# Patient Record
Sex: FEMALE | Race: WHITE
[De-identification: names, ages, dates, MRNs, and addresses within clinical notes are randomized per-mention and may not be internally consistent; named-entity substitution may affect disease eponyms.]

---

## 2017-11-17 ENCOUNTER — HOSPITAL ENCOUNTER (OUTPATIENT)
Dept: HOSPITAL 84 - D.MAMMO | Age: 42
Discharge: HOME | End: 2017-11-17
Attending: OBSTETRICS & GYNECOLOGY
Payer: COMMERCIAL

## 2017-11-17 VITALS — BODY MASS INDEX: 33 KG/M2

## 2017-11-17 DIAGNOSIS — Z12.31: Primary | ICD-10-CM

## 2019-01-10 ENCOUNTER — HOSPITAL ENCOUNTER (OUTPATIENT)
Dept: HOSPITAL 84 - D.MAMMO | Age: 44
Discharge: HOME | End: 2019-01-10
Attending: OBSTETRICS & GYNECOLOGY
Payer: COMMERCIAL

## 2019-01-10 VITALS — BODY MASS INDEX: 33 KG/M2

## 2019-01-10 DIAGNOSIS — Z12.31: Primary | ICD-10-CM

## 2019-10-09 ENCOUNTER — HOSPITAL ENCOUNTER (OUTPATIENT)
Dept: HOSPITAL 84 - D.US | Age: 44
Discharge: HOME | End: 2019-10-09
Attending: INTERNAL MEDICINE
Payer: COMMERCIAL

## 2019-10-09 VITALS — BODY MASS INDEX: 33 KG/M2

## 2019-10-09 DIAGNOSIS — R10.9: Primary | ICD-10-CM

## 2019-10-09 DIAGNOSIS — R11.0: ICD-10-CM

## 2019-11-06 ENCOUNTER — HOSPITAL ENCOUNTER (OUTPATIENT)
Dept: HOSPITAL 84 - D.OPS | Age: 44
Discharge: HOME | End: 2019-11-06
Attending: SURGERY
Payer: COMMERCIAL

## 2019-11-06 VITALS
WEIGHT: 175 LBS | BODY MASS INDEX: 28.12 KG/M2 | HEIGHT: 66 IN | HEIGHT: 66 IN | BODY MASS INDEX: 28.12 KG/M2 | WEIGHT: 175 LBS

## 2019-11-06 VITALS — DIASTOLIC BLOOD PRESSURE: 63 MMHG | SYSTOLIC BLOOD PRESSURE: 112 MMHG

## 2019-11-06 DIAGNOSIS — K80.80: Primary | ICD-10-CM

## 2019-11-06 LAB
ANION GAP SERPL CALC-SCNC: 13.1 MMOL/L (ref 8–16)
BASOPHILS NFR BLD AUTO: 0.2 % (ref 0–2)
BUN SERPL-MCNC: 5 MG/DL (ref 7–18)
CALCIUM SERPL-MCNC: 9 MG/DL (ref 8.5–10.1)
CHLORIDE SERPL-SCNC: 103 MMOL/L (ref 98–107)
CO2 SERPL-SCNC: 26.6 MMOL/L (ref 21–32)
CREAT SERPL-MCNC: 0.5 MG/DL (ref 0.6–1.3)
EOSINOPHIL NFR BLD: 3.7 % (ref 0–7)
ERYTHROCYTE [DISTWIDTH] IN BLOOD BY AUTOMATED COUNT: 12.7 % (ref 11.5–14.5)
GLUCOSE SERPL-MCNC: 97 MG/DL (ref 74–106)
HCG UR QL: NEGATIVE
HCT VFR BLD CALC: 42.5 % (ref 36–48)
HGB BLD-MCNC: 14.1 G/DL (ref 12–16)
IMM GRANULOCYTES NFR BLD: 0.4 % (ref 0–5)
LYMPHOCYTES NFR BLD AUTO: 16.9 % (ref 15–50)
MCH RBC QN AUTO: 32 PG (ref 26–34)
MCHC RBC AUTO-ENTMCNC: 33.2 G/DL (ref 31–37)
MCV RBC: 96.4 FL (ref 80–100)
MONOCYTES NFR BLD: 6.4 % (ref 2–11)
NEUTROPHILS NFR BLD AUTO: 72.4 % (ref 40–80)
OSMOLALITY SERPL CALC.SUM OF ELEC: 274 MOSM/KG (ref 275–300)
PLATELET # BLD: 324 10X3/UL (ref 130–400)
PMV BLD AUTO: 9.4 FL (ref 7.4–10.4)
POTASSIUM SERPL-SCNC: 3.7 MMOL/L (ref 3.5–5.1)
RBC # BLD AUTO: 4.41 10X6/UL (ref 4–5.4)
SODIUM SERPL-SCNC: 139 MMOL/L (ref 136–145)
WBC # BLD AUTO: 10.9 10X3/UL (ref 4.8–10.8)

## 2019-11-06 NOTE — NUR
1040-REC'D FROM RR. AWAKE AND ALERT. REPORTS PAIN 5/10 WITH HISTORY
OF CHRONIC PAIN AND WEARS A PAIN PATCH. STERI STRIPS TO ABD X 4 AREAS
CDI. VSS. FAMILY AT BEDSIDE.

## 2019-11-06 NOTE — NUR
1200-DISCHARGE CRITERIA MET. REMOVED IV WITH CATH INTACT,DISPOSED
INTO [facility]ARPS. COVERED SITE WITH BANDAID. REVIEWED POST OPERATIVE
INSTRUCTIONS WITH PT. VERBALIZED UNDERSTANDING. ESCORTED OUT VIA W/C
WITH MOTHER AWAITING TO DRIVE HOME.

## 2019-12-31 ENCOUNTER — HOSPITAL ENCOUNTER (OUTPATIENT)
Dept: HOSPITAL 84 - D.LAB | Age: 44
Discharge: HOME | End: 2019-12-31
Attending: INTERNAL MEDICINE
Payer: COMMERCIAL

## 2019-12-31 VITALS — BODY MASS INDEX: 28.3 KG/M2

## 2019-12-31 DIAGNOSIS — K76.0: Primary | ICD-10-CM

## 2019-12-31 DIAGNOSIS — R74.8: ICD-10-CM

## 2019-12-31 LAB
ALBUMIN SERPL-MCNC: 3.4 G/DL (ref 3.4–5)
ALP SERPL-CCNC: 66 U/L (ref 46–116)
ALT SERPL-CCNC: 23 U/L (ref 10–68)
BILIRUB DIRECT SERPL-MCNC: 0.08 MG/DL (ref 0–0.3)
BILIRUB INDIRECT SERPL-MCNC: 0.16 MG/DL (ref 0–1)
BILIRUB SERPL-MCNC: 0.24 MG/DL (ref 0.2–1.3)
GLOBULIN SER-MCNC: 3.8 G/L
PROT SERPL-MCNC: 7.2 G/DL (ref 6.4–8.2)

## 2021-06-14 ENCOUNTER — HOSPITAL ENCOUNTER (OUTPATIENT)
Dept: HOSPITAL 84 - D.US | Age: 46
Discharge: HOME | End: 2021-06-14
Attending: INTERNAL MEDICINE
Payer: COMMERCIAL

## 2021-06-14 VITALS — BODY MASS INDEX: 28.3 KG/M2

## 2021-06-14 DIAGNOSIS — K76.0: Primary | ICD-10-CM

## 2021-06-14 LAB
ALBUMIN SERPL-MCNC: 3.4 G/DL (ref 3.4–5)
ALP SERPL-CCNC: 64 U/L (ref 30–120)
ALT SERPL-CCNC: 26 U/L (ref 10–68)
BILIRUB DIRECT SERPL-MCNC: 0.11 MG/DL (ref 0–0.3)
BILIRUB INDIRECT SERPL-MCNC: 0.19 MG/DL (ref 0–1)
BILIRUB SERPL-MCNC: 0.3 MG/DL (ref 0.2–1.3)
GLOBULIN SER-MCNC: 3.5 G/L
PROT SERPL-MCNC: 6.9 G/DL (ref 6.4–8.2)